# Patient Record
Sex: FEMALE | Race: BLACK OR AFRICAN AMERICAN | NOT HISPANIC OR LATINO | Employment: FULL TIME | URBAN - METROPOLITAN AREA
[De-identification: names, ages, dates, MRNs, and addresses within clinical notes are randomized per-mention and may not be internally consistent; named-entity substitution may affect disease eponyms.]

---

## 2024-04-16 ENCOUNTER — HOSPITAL ENCOUNTER (OUTPATIENT)
Dept: RADIOLOGY | Facility: HOSPITAL | Age: 54
Discharge: HOME | End: 2024-04-16
Payer: COMMERCIAL

## 2024-04-16 VITALS — HEIGHT: 64 IN | BODY MASS INDEX: 26.29 KG/M2 | WEIGHT: 154 LBS

## 2024-04-16 DIAGNOSIS — Z12.31 ENCOUNTER FOR SCREENING MAMMOGRAM FOR MALIGNANT NEOPLASM OF BREAST: ICD-10-CM

## 2024-04-16 PROCEDURE — 77063 BREAST TOMOSYNTHESIS BI: CPT | Performed by: RADIOLOGY

## 2024-04-16 PROCEDURE — 77067 SCR MAMMO BI INCL CAD: CPT

## 2024-04-16 PROCEDURE — 77067 SCR MAMMO BI INCL CAD: CPT | Performed by: RADIOLOGY

## 2024-10-18 ENCOUNTER — HOSPITAL ENCOUNTER (EMERGENCY)
Facility: HOSPITAL | Age: 54
Discharge: HOME | End: 2024-10-18
Payer: COMMERCIAL

## 2024-10-18 VITALS
SYSTOLIC BLOOD PRESSURE: 143 MMHG | OXYGEN SATURATION: 98 % | HEART RATE: 93 BPM | DIASTOLIC BLOOD PRESSURE: 78 MMHG | HEIGHT: 64 IN | TEMPERATURE: 97.6 F | BODY MASS INDEX: 23.05 KG/M2 | RESPIRATION RATE: 17 BRPM | WEIGHT: 135 LBS

## 2024-10-18 DIAGNOSIS — Z77.098 CHEMICAL EXPOSURE OF EYE: Primary | ICD-10-CM

## 2024-10-18 PROCEDURE — 99283 EMERGENCY DEPT VISIT LOW MDM: CPT

## 2024-10-18 PROCEDURE — 99284 EMERGENCY DEPT VISIT MOD MDM: CPT

## 2024-10-18 RX ORDER — ERYTHROMYCIN 5 MG/G
1 OINTMENT OPHTHALMIC NIGHTLY
Qty: 3.5 G | Refills: 0 | Status: SHIPPED | OUTPATIENT
Start: 2024-10-18 | End: 2024-10-25

## 2024-10-18 ASSESSMENT — LIFESTYLE VARIABLES
EVER HAD A DRINK FIRST THING IN THE MORNING TO STEADY YOUR NERVES TO GET RID OF A HANGOVER: NO
EVER FELT BAD OR GUILTY ABOUT YOUR DRINKING: NO
HAVE PEOPLE ANNOYED YOU BY CRITICIZING YOUR DRINKING: NO
HAVE YOU EVER FELT YOU SHOULD CUT DOWN ON YOUR DRINKING: NO
TOTAL SCORE: 0

## 2024-10-18 ASSESSMENT — VISUAL ACUITY
OD: 20/50
OS: 20/70
OU: 20/70

## 2024-10-18 ASSESSMENT — PAIN - FUNCTIONAL ASSESSMENT: PAIN_FUNCTIONAL_ASSESSMENT: 0-10

## 2024-10-18 ASSESSMENT — COLUMBIA-SUICIDE SEVERITY RATING SCALE - C-SSRS
6. HAVE YOU EVER DONE ANYTHING, STARTED TO DO ANYTHING, OR PREPARED TO DO ANYTHING TO END YOUR LIFE?: NO
1. IN THE PAST MONTH, HAVE YOU WISHED YOU WERE DEAD OR WISHED YOU COULD GO TO SLEEP AND NOT WAKE UP?: NO
2. HAVE YOU ACTUALLY HAD ANY THOUGHTS OF KILLING YOURSELF?: NO

## 2024-10-18 ASSESSMENT — PAIN SCALES - GENERAL: PAINLEVEL_OUTOF10: 0 - NO PAIN

## 2024-10-18 NOTE — ED TRIAGE NOTES
Pt states bleach splashed into her right eye, eye was flushed at work, pt needs a return to work note, has no vision changes

## 2024-10-18 NOTE — ED PROVIDER NOTES
"HPI   Chief Complaint   Patient presents with    Chemical Exposure       54-year-old female presents for chief complaint of chemical exposure to the right eye.  While at work today she accidentally endorses a \"small amount of bleach\" in her right eye.  States that it was \"just a drop\".  She was able to immediately irrigate for over 30 minutes with the emergency eye flush at work.  Denies any vision changes.  States that he has no pain or burning.  States that she feels her normal self.  States that she is here because her work advised her to come and she needs to get a work note to return.  She does not wear glasses or contacts and has not been to the ophthalmology doctor in many years.  Denies any other complaints.  States that she \"just wants to go back to work as fast as possible today\".              Patient History   No past medical history on file.  Past Surgical History:   Procedure Laterality Date    US GUIDED MEDIASTINUM PERCUTANEOUS BIOPSY  1/16/2020    US GUIDED MEDIASTINUM PERCUTANEOUS BIOPSY 1/16/2020 [facility]     No family history on file.  Social History     Tobacco Use    Smoking status: Not on file    Smokeless tobacco: Not on file   Substance Use Topics    Alcohol use: Not on file    Drug use: Not on file       Physical Exam   ED Triage Vitals [10/18/24 1355]   Temperature Heart Rate Respirations BP   36.4 °C (97.6 °F) 93 17 143/78      Pulse Ox Temp Source Heart Rate Source Patient Position   97 % Temporal Monitor Sitting      BP Location FiO2 (%)     Right arm --       Physical Exam  Vitals and nursing note reviewed.   Constitutional:       General: She is not in acute distress.     Appearance: She is well-developed.   HENT:      Head: Normocephalic and atraumatic.   Eyes:      Conjunctiva/sclera: Conjunctivae normal.      Comments: PERRLA.  No erythema.  No chemosis.  EOM intact.   Cardiovascular:      Rate and Rhythm: Normal rate and regular rhythm.      Heart sounds: No murmur " "heard.  Pulmonary:      Effort: Pulmonary effort is normal. No respiratory distress.      Breath sounds: Normal breath sounds.   Abdominal:      Palpations: Abdomen is soft.      Tenderness: There is no abdominal tenderness.   Musculoskeletal:         General: No swelling.      Cervical back: Neck supple.   Skin:     General: Skin is warm and dry.      Capillary Refill: Capillary refill takes less than 2 seconds.   Neurological:      Mental Status: She is alert.   Psychiatric:         Mood and Affect: Mood normal.           ED Course & MDM   Diagnoses as of 10/18/24 1523   Chemical exposure of eye                 No data recorded                                 Medical Decision Making  Vital signs reviewed, unremarkable at this time.  Patient is well-appearing in no apparent distress.  Speaks full sentences but difficulty.  Visual acuity check normal for patient for baseline.  States that she does not wear glasses or contacts and has not been to the eye doctor in \"many years\".  She able to irrigate her eyes thoroughly for about 30 minutes at least she says prior to come to the ED.  She has no complaints currently but needs a work note in order to get back to work.  She declines any further testing then visual acuity check.  She was offered pH strips and fluorescein exam but patient states that she has no time needs to go back to work now.  She has no complaints and her vision is at baseline.  Ocular exam unremarkable at this point.  I am okay with this.  Offered also to irrigate for additional time but patient declines.  Patient was advised to follow-up with ophthalmology and to return to the ED with any new or worsening symptoms.  Erythromycin ointment given for nighttime use and drops with saline given to use daily.  Patient in agreement with this plan.  Discharged stable condition.        Procedure  Procedures     Nahum Narayanan, APRN-CNP  10/18/24 1530    "

## 2024-10-18 NOTE — Clinical Note
Uyen Smith was seen and treated in our emergency department on 10/18/2024.  She may return to work on 10/18/2024.  She can return to normal work duties today.     If you have any questions or concerns, please don't hesitate to call.      Nahum Narayanan, APRN-CNP

## 2025-10-30 ENCOUNTER — APPOINTMENT (OUTPATIENT)
Dept: DERMATOLOGY | Facility: CLINIC | Age: 55
End: 2025-10-30
Payer: COMMERCIAL

## 2025-10-30 ENCOUNTER — APPOINTMENT (OUTPATIENT)
Facility: HOSPITAL | Age: 55
End: 2025-10-30
Payer: COMMERCIAL